# Patient Record
Sex: FEMALE | Race: WHITE | Employment: UNEMPLOYED | URBAN - METROPOLITAN AREA
[De-identification: names, ages, dates, MRNs, and addresses within clinical notes are randomized per-mention and may not be internally consistent; named-entity substitution may affect disease eponyms.]

---
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
## 2024-05-28 ENCOUNTER — HOSPITAL ENCOUNTER (EMERGENCY)
Facility: HOSPITAL | Age: 24
Discharge: HOME | End: 2024-05-28
Attending: EMERGENCY MEDICINE

## 2024-05-28 VITALS
DIASTOLIC BLOOD PRESSURE: 85 MMHG | BODY MASS INDEX: 23.3 KG/M2 | HEIGHT: 66 IN | OXYGEN SATURATION: 98 % | SYSTOLIC BLOOD PRESSURE: 120 MMHG | HEART RATE: 85 BPM | RESPIRATION RATE: 16 BRPM | WEIGHT: 145 LBS | TEMPERATURE: 98.6 F

## 2024-05-28 DIAGNOSIS — J02.9 ACUTE PHARYNGITIS, UNSPECIFIED ETIOLOGY: Primary | ICD-10-CM

## 2024-05-28 DIAGNOSIS — Z72.0 VAPES NICOTINE CONTAINING SUBSTANCE: ICD-10-CM

## 2024-05-28 LAB
APPEARANCE UR: CLEAR
BACTERIA URNS QL MICRO: NEGATIVE /HPF
BILIRUB UR QL: NEGATIVE
COLOR UR: NORMAL
DEPRECATED S PYO AG THROAT QL EIA: NEGATIVE
EPITH CASTS URNS QL MICRO: NORMAL /LPF
FLUAV RNA SPEC QL NAA+PROBE: NOT DETECTED
FLUBV RNA SPEC QL NAA+PROBE: NOT DETECTED
GLUCOSE UR STRIP.AUTO-MCNC: NEGATIVE MG/DL
HGB UR QL STRIP: NEGATIVE
KETONES UR QL STRIP.AUTO: NEGATIVE MG/DL
LEUKOCYTE ESTERASE UR QL STRIP.AUTO: NEGATIVE
NITRITE UR QL STRIP.AUTO: NEGATIVE
PH UR STRIP: 8 (ref 5–8)
PROT UR STRIP-MCNC: NEGATIVE MG/DL
RBC #/AREA URNS HPF: NORMAL /HPF
SARS-COV-2 RNA RESP QL NAA+PROBE: NOT DETECTED
SP GR UR REFRACTOMETRY: 1.02 (ref 1–1.03)
URINE CULTURE IF INDICATED: NORMAL
UROBILINOGEN UR QL STRIP.AUTO: 0.2 EU/DL (ref 0.2–1)
WBC URNS QL MICRO: NORMAL /HPF (ref 0–4)

## 2024-05-28 PROCEDURE — 87880 STREP A ASSAY W/OPTIC: CPT

## 2024-05-28 PROCEDURE — 6360000002 HC RX W HCPCS: Performed by: NURSE PRACTITIONER

## 2024-05-28 PROCEDURE — 99284 EMERGENCY DEPT VISIT MOD MDM: CPT

## 2024-05-28 PROCEDURE — 81001 URINALYSIS AUTO W/SCOPE: CPT

## 2024-05-28 PROCEDURE — 87636 SARSCOV2 & INF A&B AMP PRB: CPT

## 2024-05-28 PROCEDURE — 87070 CULTURE OTHR SPECIMN AEROBIC: CPT

## 2024-05-28 PROCEDURE — 96372 THER/PROPH/DIAG INJ SC/IM: CPT

## 2024-05-28 RX ORDER — ONDANSETRON 4 MG/1
4 TABLET, ORALLY DISINTEGRATING ORAL 3 TIMES DAILY PRN
Qty: 21 TABLET | Refills: 0 | Status: SHIPPED | OUTPATIENT
Start: 2024-05-28

## 2024-05-28 RX ORDER — AMOXICILLIN 875 MG/1
875 TABLET, COATED ORAL 2 TIMES DAILY
Qty: 20 TABLET | Refills: 0 | Status: SHIPPED | OUTPATIENT
Start: 2024-05-28 | End: 2024-06-07

## 2024-05-28 RX ORDER — DEXAMETHASONE SODIUM PHOSPHATE 10 MG/ML
10 INJECTION, SOLUTION INTRAMUSCULAR; INTRAVENOUS
Status: COMPLETED | OUTPATIENT
Start: 2024-05-28 | End: 2024-05-28

## 2024-05-28 RX ORDER — GUAIFENESIN 600 MG/1
600 TABLET, EXTENDED RELEASE ORAL 2 TIMES DAILY
Qty: 30 TABLET | Refills: 0 | Status: SHIPPED | OUTPATIENT
Start: 2024-05-28 | End: 2024-06-12

## 2024-05-28 RX ORDER — PREDNISONE 50 MG/1
50 TABLET ORAL DAILY
Qty: 5 TABLET | Refills: 0 | Status: SHIPPED | OUTPATIENT
Start: 2024-05-28 | End: 2024-06-02

## 2024-05-28 RX ORDER — KETOROLAC TROMETHAMINE 30 MG/ML
30 INJECTION, SOLUTION INTRAMUSCULAR; INTRAVENOUS
Status: COMPLETED | OUTPATIENT
Start: 2024-05-28 | End: 2024-05-28

## 2024-05-28 RX ORDER — IBUPROFEN 600 MG/1
600 TABLET ORAL EVERY 6 HOURS PRN
Qty: 30 TABLET | Refills: 0 | Status: SHIPPED | OUTPATIENT
Start: 2024-05-28

## 2024-05-28 RX ADMIN — DEXAMETHASONE SODIUM PHOSPHATE 10 MG: 10 INJECTION, SOLUTION INTRAMUSCULAR; INTRAVENOUS at 16:53

## 2024-05-28 RX ADMIN — KETOROLAC TROMETHAMINE 30 MG: 30 INJECTION, SOLUTION INTRAMUSCULAR at 16:54

## 2024-05-28 ASSESSMENT — ENCOUNTER SYMPTOMS
SORE THROAT: 1
RHINORRHEA: 1
NAUSEA: 1
COUGH: 1

## 2024-05-28 ASSESSMENT — PAIN DESCRIPTION - DESCRIPTORS: DESCRIPTORS: BURNING

## 2024-05-28 ASSESSMENT — LIFESTYLE VARIABLES
HOW OFTEN DO YOU HAVE A DRINK CONTAINING ALCOHOL: NEVER
HOW MANY STANDARD DRINKS CONTAINING ALCOHOL DO YOU HAVE ON A TYPICAL DAY: PATIENT DOES NOT DRINK

## 2024-05-28 ASSESSMENT — PAIN DESCRIPTION - LOCATION: LOCATION: THROAT

## 2024-05-28 ASSESSMENT — PAIN DESCRIPTION - PAIN TYPE: TYPE: ACUTE PAIN

## 2024-05-28 ASSESSMENT — PAIN SCALES - GENERAL: PAINLEVEL_OUTOF10: 5

## 2024-05-28 ASSESSMENT — PAIN - FUNCTIONAL ASSESSMENT: PAIN_FUNCTIONAL_ASSESSMENT: 0-10

## 2024-05-28 NOTE — DISCHARGE INSTRUCTIONS
Your COVID test, flu test and rapid strep test are negative.  We do send off a throat culture which should result within the next 48 hours, you will receive a phone call if you should change antibiotics.  If you do not receive a phone call, then I would take that is good news.  Please follow-up with an ENT for your ongoing issues with sore throats and sinus congestion.  Do sinus rinses at home as post nasal drip often triggers nausea in the morning.  To prevent dehydration, drink plenty of fluids. Choose water and other clear liquids until you feel better. If you have kidney, heart, or liver disease and have to limit fluids, talk with your doctor before you increase the amount of fluids you drink.  Ask your doctor if you can take an over-the-counter pain medicine, such as acetaminophen (Tylenol), ibuprofen (Advil, Motrin), or naproxen (Aleve). Be safe with medicines. Read and follow all instructions on the label. No one younger than 18 should take aspirin. It has been linked to Reye syndrome, a serious illness.  Be careful when taking over-the-counter cold or influenza (flu) medicines and Tylenol at the same time. Many of these medicines have acetaminophen, which is Tylenol. Read the labels to make sure that you are not taking more than the recommended dose. Too much acetaminophen (Tylenol) can be harmful.  Get plenty of rest.  Use saline (saltwater) nasal washes to help keep your nasal passages open and wash out mucus and allergens. You can buy saline nose sprays at a grocery store or drugstore. Follow the instructions on the package. Or you can make your own at home. Add 1 teaspoon (5 mL) of non-iodized salt and 1 teaspoon (5 mL) of baking soda to 2 cups (500 mL) of distilled or boiled and cooled water. Fill a squeeze bottle or neti pot with the nasal wash. Then put the tip into your nostril, and lean over the sink. With your mouth open, gently squirt the liquid. Repeat on the other side.  Use a vaporizer or

## 2024-05-28 NOTE — ED TRIAGE NOTES
To ED with c/o sinus congestion, sore throat, and slight cough off & on for the last month, but today is the worst she has felt.  Had body aches & chills yesterday.  No OTC meds today, but did take a Zofran ODT at 0730 this morning.  LMP 4/2, has IUD in place.

## 2024-05-28 NOTE — ED PROVIDER NOTES
above the remainder of the review of systems was reviewed and negative.       PAST MEDICAL HISTORY   No past medical history on file.      SURGICAL HISTORY     No past surgical history on file.      CURRENT MEDICATIONS       Discharge Medication List as of 5/28/2024  5:42 PM          ALLERGIES     Patient has no known allergies.    FAMILY HISTORY     No family history on file.       SOCIAL HISTORY       Social History     Socioeconomic History    Marital status: Single           PHYSICAL EXAM    (up to 7 for level 4, 8 or more for level 5)     ED Triage Vitals [05/28/24 1622]   BP Temp Temp Source Pulse Respirations SpO2 Height Weight - Scale   120/85 98.6 °F (37 °C) Oral 85 16 98 % 1.676 m (5' 6\") 65.8 kg (145 lb)       Body mass index is 23.4 kg/m².    Physical Exam  Vitals and nursing note reviewed.   Constitutional:       Appearance: Normal appearance.   HENT:      Head: Normocephalic.      Right Ear: External ear normal.      Left Ear: External ear normal.      Nose: Congestion and rhinorrhea present.      Mouth/Throat:      Mouth: Mucous membranes are moist.      Tonsils: No tonsillar exudate. 2+ on the right. 2+ on the left.   Eyes:      Extraocular Movements: Extraocular movements intact.      Conjunctiva/sclera: Conjunctivae normal.      Pupils: Pupils are equal, round, and reactive to light.   Cardiovascular:      Rate and Rhythm: Normal rate and regular rhythm.   Pulmonary:      Effort: Pulmonary effort is normal.      Breath sounds: Normal breath sounds.   Abdominal:      General: Abdomen is flat.   Musculoskeletal:         General: Normal range of motion.      Cervical back: Normal range of motion and neck supple.   Skin:     General: Skin is warm and dry.      Capillary Refill: Capillary refill takes less than 2 seconds.   Neurological:      General: No focal deficit present.      Mental Status: She is alert and oriented to person, place, and time.   Psychiatric:         Mood and Affect: Mood normal.

## 2024-05-30 LAB
BACTERIA SPEC CULT: NORMAL
SERVICE CMNT-IMP: NORMAL

## 2024-06-21 ENCOUNTER — TELEPHONE (OUTPATIENT)
Age: 24
End: 2024-06-21

## 2024-06-21 NOTE — PROGRESS NOTES
Curly Mujica is a 24 y.o. female presents for a problem visit.    No chief complaint on file.    No LMP recorded.  Birth Control: IUD.  Last Pap: no pap smear on file     The patient is reporting having: IUD Follow Up , right side cramping for 1 weeks. IUD was inserted a year ago    Aggravating factors include none.  And alleviating factors include none.        1. Have you been to the ER, urgent care clinic, or hospitalized since your last visit? No    2. Have you seen or consulted any other health care providers outside of the [facility] since your last visit? No    Examination chaperoned by Kendal Guillory LPN.

## 2024-06-21 NOTE — TELEPHONE ENCOUNTER
Two patient identifiers used    24 year old to be new patient on 6/24/2024 for iud check     Patient calling to say that she went to [facility] er on 6/19/2024 due to cramping pinching pain and was told that her IUD is floating around in her uterus in a blood clot. Patient reports the cramping pinching pain is at 7 on the pain scale of 1-10 and denies vaginal bleeding.    Patient states she has not been able to feel the strings since insertion     Patient states the same thing happened last August ( 2023) and she had to have surgery to remove and reinsert the iud         Patient is checking to see where that surgery happened since it was not [facility] that she had the surgery [facility] patient August 2023( does not know the date)    Patient disconnected and called back and schedule for this afternoon with different provider    This nurse called the patient back and does not want to see different provider and was advised the Dr. Vázquez is not in the office today . Patient wants to keep 6/24/2024 appointment     Patient was placed back on the schedule to be seen for 9:30am ultrasound and then to see MD at 10:10am new patient ( ok per FW)    Patient was advised if cramping is debilitating to go to the er for evaluation.     Patient verbalized understanding.          Patient was advise of need to bring any old records to her upcoming appointment

## 2024-06-24 ENCOUNTER — OFFICE VISIT (OUTPATIENT)
Age: 24
End: 2024-06-24

## 2024-06-24 VITALS — SYSTOLIC BLOOD PRESSURE: 107 MMHG | WEIGHT: 144 LBS | BODY MASS INDEX: 23.24 KG/M2 | DIASTOLIC BLOOD PRESSURE: 75 MMHG

## 2024-06-24 DIAGNOSIS — R10.2 PELVIC PAIN: ICD-10-CM

## 2024-06-24 DIAGNOSIS — T83.32XA MALPOSITIONED INTRAUTERINE DEVICE (IUD), INITIAL ENCOUNTER: ICD-10-CM

## 2024-06-24 DIAGNOSIS — Z30.432 ENCOUNTER FOR IUD REMOVAL: Primary | ICD-10-CM

## 2024-06-24 PROCEDURE — 58301 REMOVE INTRAUTERINE DEVICE: CPT | Performed by: OBSTETRICS & GYNECOLOGY

## 2024-06-24 PROCEDURE — 99203 OFFICE O/P NEW LOW 30 MIN: CPT | Performed by: OBSTETRICS & GYNECOLOGY

## 2024-06-24 NOTE — PROGRESS NOTES
GYN Problem Visit Note    Chief Complaint   Follow-up and Ultrasound   No LMP recorded. (Menstrual status: IUD)..      HPI  Curly Mujica is a 24 y.o. female who complains of   Chief Complaint   Patient presents with    Follow-up    Ultrasound       She reports previous ultrasound showed \"IUD floating\" in uterus and she has had excruciating pelvic pain.      Ultrasound showed:  UTERUS IS ANTEVERTED, NORMAL IN SIZE AND ECHOGENICITY. ENDOMETRIUM MEASURES 7-8MM IN THICKNESS. NO EVIDENCE OF MASSES OR ABNORMALITIES ARE SEEN. THE IUD APPEARS TO BE UPSIDE DOWN AND TO THE LEFT OF THE ENDOMETRIUM. RIGHT OVARY APPEARS WITHIN NORMAL LIMITS. LEFT ADNEXA APPEARS WITHIN NORMAL LIMITS. NO FREE FLUID SEEN IN THE CDS.    Per Nursing Note:  No LMP recorded.  Birth Control: IUD.  Last Pap: no pap smear on file      The patient is reporting having: IUD Follow Up , right side cramping for 1 weeks. IUD was inserted a year ago     Aggravating factors include none.  And alleviating factors include none.    History reviewed. No pertinent past medical history.  History reviewed. No pertinent surgical history.  Social History     Occupational History    Not on file   Tobacco Use    Smoking status: Never    Smokeless tobacco: Never   Vaping Use    Vaping Use: Every day   Substance and Sexual Activity    Alcohol use: Never    Drug use: Never    Sexual activity: Yes     Partners: Male     Birth control/protection: I.U.D.     History reviewed. No pertinent family history.    No Known Allergies  Prior to Admission medications    Medication Sig Start Date End Date Taking? Authorizing Provider   ibuprofen (IBU) 600 MG tablet Take 1 tablet by mouth every 6 hours as needed for Pain  Patient not taking: Reported on 6/24/2024 5/28/24   Yaima Weeks APRN - NP   ondansetron (ZOFRAN-ODT) 4 MG disintegrating tablet Take 1 tablet by mouth 3 times daily as needed for Nausea or Vomiting  Patient not taking: Reported on 6/24/2024 5/28/24   Desi

## 2024-06-24 NOTE — PROGRESS NOTES
IUD REMOVAL  Indications for Removal:  Curly Mujica is a No obstetric history on file.,  24 y.o. female White (non-) whose No LMP recorded. (Menstrual status: IUD).  .   She presents today for IUD removal.   The IUD removal procedure was discussed with the patient and she had no further questions.   Procedure: The patient was placed in a dorsal lithotomy position and appropriately draped. On bimanual exam the uterus was anterior and normal in size with no tenderness present. A speculum exam was performed and the cervix was visualized. The cervix was prepped with zephiran solution. The IUD string was not visualized. Using a long alligator clamp , the string was grasped inside the cervical canal and the IUD removed intact. The IUD was shown to the patient. She tolerated the procedure well.